# Patient Record
Sex: MALE | Race: WHITE | Employment: FULL TIME | URBAN - METROPOLITAN AREA
[De-identification: names, ages, dates, MRNs, and addresses within clinical notes are randomized per-mention and may not be internally consistent; named-entity substitution may affect disease eponyms.]

---

## 2023-09-16 LAB
CHOLESTEROL (MG/DL) IN SER/PLAS: 122 MG/DL (ref 0–199)
CHOLESTEROL IN HDL (MG/DL) IN SER/PLAS: 37.5 MG/DL
CHOLESTEROL/HDL RATIO: 3.3
LDL: 70 MG/DL (ref 0–99)
TRIGLYCERIDE (MG/DL) IN SER/PLAS: 73 MG/DL (ref 0–149)
VLDL: 15 MG/DL (ref 0–40)

## 2023-09-20 PROBLEM — E10.9 TYPE 1 DIABETES MELLITUS (MULTI): Status: ACTIVE | Noted: 2023-09-20

## 2023-09-20 PROBLEM — I10 HTN (HYPERTENSION): Status: ACTIVE | Noted: 2023-09-20

## 2023-09-20 PROBLEM — E10.649 HYPOGLYCEMIA DUE TO TYPE 1 DIABETES MELLITUS (MULTI): Status: ACTIVE | Noted: 2023-09-20

## 2023-09-20 PROBLEM — Z96.41 INSULIN PUMP IN PLACE: Status: ACTIVE | Noted: 2023-09-20

## 2023-09-20 PROBLEM — E66.9 OBESITY (BMI 30-39.9): Status: ACTIVE | Noted: 2023-09-20

## 2023-09-20 PROBLEM — F51.01 INSOMNIA, IDIOPATHIC: Status: ACTIVE | Noted: 2023-09-20

## 2023-09-20 PROBLEM — E78.5 HYPERLIPIDEMIA: Status: ACTIVE | Noted: 2023-09-20

## 2023-09-20 RX ORDER — ESZOPICLONE 3 MG/1
1 TABLET, FILM COATED ORAL NIGHTLY PRN
COMMUNITY
Start: 2021-05-14

## 2023-09-20 RX ORDER — SEMAGLUTIDE 1.34 MG/ML
INJECTION, SOLUTION SUBCUTANEOUS
COMMUNITY
Start: 2022-05-03 | End: 2023-10-20 | Stop reason: ALTCHOICE

## 2023-09-20 RX ORDER — ZOLPIDEM TARTRATE 10 MG/1
TABLET ORAL
COMMUNITY
Start: 2023-09-06 | End: 2023-10-06

## 2023-09-20 RX ORDER — GLUCAGON INJECTION, SOLUTION 1 MG/.2ML
INJECTION, SOLUTION SUBCUTANEOUS
COMMUNITY
Start: 2022-04-13 | End: 2023-10-20 | Stop reason: SDUPTHER

## 2023-09-20 RX ORDER — INSULIN GLARGINE 100 [IU]/ML
INJECTION, SOLUTION SUBCUTANEOUS
COMMUNITY
End: 2023-10-20 | Stop reason: ALTCHOICE

## 2023-09-20 RX ORDER — LISINOPRIL 5 MG/1
1 TABLET ORAL DAILY
COMMUNITY
Start: 2019-11-12

## 2023-09-20 RX ORDER — ROSUVASTATIN CALCIUM 5 MG/1
5 TABLET, COATED ORAL DAILY
COMMUNITY
End: 2024-02-22

## 2023-09-20 RX ORDER — INSULIN ASPART INJECTION 100 [IU]/ML
INJECTION, SOLUTION SUBCUTANEOUS
COMMUNITY
Start: 2021-11-26 | End: 2023-10-20 | Stop reason: ALTCHOICE

## 2023-09-20 RX ORDER — HYDROXYZINE HYDROCHLORIDE 50 MG/1
TABLET, FILM COATED ORAL
COMMUNITY
Start: 2022-07-21

## 2023-10-11 ENCOUNTER — PATIENT MESSAGE (OUTPATIENT)
Dept: ENDOCRINOLOGY | Facility: CLINIC | Age: 33
End: 2023-10-11
Payer: MEDICARE

## 2023-10-20 ENCOUNTER — TELEMEDICINE (OUTPATIENT)
Dept: ENDOCRINOLOGY | Facility: CLINIC | Age: 33
End: 2023-10-20
Payer: MEDICARE

## 2023-10-20 DIAGNOSIS — Z96.41 INSULIN PUMP IN PLACE: ICD-10-CM

## 2023-10-20 DIAGNOSIS — E10.65 TYPE 1 DIABETES MELLITUS WITH HYPERGLYCEMIA (MULTI): ICD-10-CM

## 2023-10-20 DIAGNOSIS — E10.649 HYPOGLYCEMIA DUE TO TYPE 1 DIABETES MELLITUS (MULTI): Primary | ICD-10-CM

## 2023-10-20 PROCEDURE — 99214 OFFICE O/P EST MOD 30 MIN: CPT | Performed by: NURSE PRACTITIONER

## 2023-10-20 RX ORDER — INSULIN GLARGINE 300 U/ML
INJECTION, SOLUTION SUBCUTANEOUS
Qty: 9 ML | Refills: 3 | Status: SHIPPED | OUTPATIENT
Start: 2023-10-20

## 2023-10-20 RX ORDER — GLUCAGON INJECTION, SOLUTION 1 MG/.2ML
INJECTION, SOLUTION SUBCUTANEOUS
Qty: 0.4 ML | Refills: 3 | Status: SHIPPED | OUTPATIENT
Start: 2023-10-20

## 2023-10-20 NOTE — PROGRESS NOTES
Subjective   Sven Son is a 33 y.o. male presents today for a follow up of DM Type 1. Initial diagnosis with diabetes was at age 4, in 1995.  The patient was adopted so unknown family history.    Known complications include: obesity    Last visit with me 6/2023  A1c 7.1% in 9/2023 from .  Previous A1c 6.5% in 6/2023.   Since last visit, he has seen PharmD between visits   We have also been in communication between visits.    Had a couple days of lows but now that has resolved.    No changes made at that time   He is enjoying his new job at [employer]       Current diabetes regimen is as follows:   Ozempic 2 mg once weekly   Humalog U200 via Tslim Control IQ insulin pump     Patient is using continuous glucose monitor Dexcom G6  The patient is currently checking the blood glucose as needed    Hypoglycemia frequency: 0%  Hypoglycemia awareness: Yes     Regarding symptoms of hyperglycemia, the patient is not experiencing any symptoms such as polyuria, polydipsia, nocturia or rapid weight loss or blurry vision. The patient comes into the office today hyperglycemia.      ROS  General: no fever, chills or acute changes in weight in the last 6 months  Skin: no rashes, pruritis or dry skin  Cardiac: denies chest pain, heart palpitations or orthopnea  Pulmonary: denies wheezing, productive cough or exertional dyspnea      Objective    Physical Exam  Unable to obtain blood pressure today due to virtual visit  General Appearance: Well appearing, alert, in no acute distress, well-hydrated, well nourished.  Affect: alert, cooperative       Current Outpatient Medications:     eszopiclone (Lunesta) 3 mg tablet, Take 1 tablet (3 mg) by mouth as needed at bedtime., Disp: , Rfl:     glucagon (Gvoke HypoPen 2-Pack) 1 mg/0.2 mL auto-injector, Use one pen device to treat severe hypoglycemic event., Disp: , Rfl:     hydrOXYzine HCL (Atarax) 50 mg tablet, take 1 (ONE) tablet by mouth daily as needed for anxiety, Disp: , Rfl:      insulin aspart, with niacinamide, (Fiasp U-100 Insulin) 100 unit/mL injection, Fill pump cartridge to 220 units every 2 days. E10.9, Disp: , Rfl:     insulin glargine (Lantus U-100 Insulin) 100 unit/mL injection, Inject with 70 units every day at bedtime, Disp: , Rfl:     insulin lispro (HumaLOG) 200 unit/mL (3 mL) insulin pen pen, Inject up to 220 units per day, Disp: , Rfl:     lisinopril 5 mg tablet, Take 1 tablet (5 mg) by mouth once daily., Disp: , Rfl:     rosuvastatin (Crestor) 5 mg tablet, Take 1 tablet (5 mg) by mouth once daily., Disp: , Rfl:     semaglutide (Ozempic) 0.25 mg or 0.5 mg(2 mg/1.5 mL) pen injector, Inject subcutaneously 0.25 mg weekly x 4 weeks then increase to 0.5 mg weekly., Disp: , Rfl:     syringe-needle,insulin,0.5 mL (BD INSULIN SYRINGE MISC), Ultrafine II 31G X 5/16' 0.5 ML ; Use as directed - use 3 daily, Disp: , Rfl:     zolpidem (Ambien) 10 mg tablet, Take 1 Tablet by mouth at bedtime as needed for Sleep for up to 30 days., Disp: , Rfl:     Assessment/Plan   Type 1 diabetes with hyperglycemia:   Hypoglycemia due to Type 1 diabetes  Insulin pump status:   -A1c up slightly from previous.  Overall okay.  Time in range is not at goal.  Having hyperglycemia post meals and will adjust carb ratio today.  He can continue to get P[facility].  Unable to do  PAP as he does not have Part D.      Plan:   Pump changes made in office today:   Carb ratio:   1130 = 1.5  1600 = 3.5  1800 = 2.5    Continue all other pump settings as is for now     Continue Dexcom G6    Follow up in 3-4 months

## 2023-10-31 ENCOUNTER — TELEPHONE (OUTPATIENT)
Dept: ENDOCRINOLOGY | Facility: CLINIC | Age: 33
End: 2023-10-31
Payer: MEDICARE

## 2023-11-21 ENCOUNTER — PATIENT MESSAGE (OUTPATIENT)
Dept: ENDOCRINOLOGY | Facility: CLINIC | Age: 33
End: 2023-11-21
Payer: MEDICARE

## 2023-11-21 NOTE — TELEPHONE ENCOUNTER
From: Sven Son  To: Belle Shaver APRN-CNP  Sent: 11/21/2023 6:05 AM EST  Subject: Travel letter     Ford Odonnell,    I am leaving out of town Dec 7 through Dec 14. Is it possible for me to get a letter that I can take with me through airport security?    Please advise

## 2023-11-21 NOTE — LETTER
November 21, 2023       Patient: Sven Son   YOB: 1990   Date of Visit: 11/21/2023     To Whom It May Concern:    The above patient is due to travel by air and has Type 1 diabetes.  He  needs to carry supplies with him at all times to avoid low and high blood sugars.      This may include but is not limited to:    Glucometer  Test strips  Lancets   Alcohol swabs  Insulin pens  Pen needles  Insulin vials  Syringes  Insulin pump  Insulin pump cartridges   Infusion sites  Food  Water       Please contact me if you have any questions.     Sincerely,     Belle Shaver, APRN-CNP

## 2023-11-28 ENCOUNTER — TELEPHONE (OUTPATIENT)
Dept: ENDOCRINOLOGY | Facility: CLINIC | Age: 33
End: 2023-11-28
Payer: MEDICARE

## 2023-11-30 ENCOUNTER — TELEPHONE (OUTPATIENT)
Dept: ENDOCRINOLOGY | Facility: CLINIC | Age: 33
End: 2023-11-30
Payer: MEDICARE

## 2023-11-30 NOTE — TELEPHONE ENCOUNTER
Received letter from [employer] stating that there is information missing from patient's enrollment form.   Spoke to representative- patient missing insurance information  Insurance information faxed with confirmation to [phone].

## 2023-12-31 ENCOUNTER — DOCUMENTATION (OUTPATIENT)
Dept: ENDOCRINOLOGY | Facility: HOSPITAL | Age: 33
End: 2023-12-31
Payer: MEDICARE

## 2024-01-01 NOTE — PROGRESS NOTES
Received a page from the patient as endocrinology on-call.  Patient is complaining of consistently tightly controlled BG over the past 2 days.  Reports that he has been ill for the past 2 days and believes that he had food poisoning.  Blood sugar is dipping after meals to 75-80s (no values lower than that, reports that he has hypoglycemia awareness, did not experience symptomatic hypoglycemia).  It appears that his insulin carb ratio was tightened on his last visit with endocrinology.  He reports that he is only putting in the amount of carbs that he is consuming.  I have no access to the patient CGM data or his exact pump setting.  For the time being asked the patient to put himself in a temporary target/exercise mode to target BG of 150.  His insulin to carb ratio may need to be softened.

## 2024-01-04 ENCOUNTER — TELEPHONE (OUTPATIENT)
Dept: ENDOCRINOLOGY | Facility: CLINIC | Age: 34
End: 2024-01-04
Payer: MEDICARE

## 2024-01-04 NOTE — TELEPHONE ENCOUNTER
"Initiated/received and returned a PWO for Roberto Carlosanna MAYA on 1/4/24  For \" non adjunctive CGM\"  Completed by me and signed by Belle Shaver CNP  Emailed/faxed back to [phone] on 1/4/24      Copy of form on file in email      "

## 2024-01-24 ENCOUNTER — TELEPHONE (OUTPATIENT)
Dept: ENDOCRINOLOGY | Facility: CLINIC | Age: 34
End: 2024-01-24
Payer: MEDICARE

## 2024-01-24 DIAGNOSIS — E10.65 TYPE 1 DIABETES MELLITUS WITH HYPERGLYCEMIA, WITH LONG-TERM CURRENT USE OF INSULIN (MULTI): Primary | ICD-10-CM

## 2024-01-24 NOTE — TELEPHONE ENCOUNTER
Left a VM to see if he needs a referral to see one of the educators. He reports having some difficulties with the G7 he recently transitioned to and would like to meet with someone. He states you can call or send a my chart to advise on best next steps.

## 2024-01-24 NOTE — TELEPHONE ENCOUNTER
Order placed.  I believe he may have seen Jaime and Olimpia in the past.  Please schedule with either one, whoever's schedule if free for his schedule.  Virtual or in person, his choice.  Thanks.

## 2024-02-15 ENCOUNTER — TELEPHONE (OUTPATIENT)
Dept: ENDOCRINOLOGY | Facility: CLINIC | Age: 34
End: 2024-02-15
Payer: MEDICARE

## 2024-02-15 NOTE — TELEPHONE ENCOUNTER
Received a DWO from [employer] on 2/14  Completed by me and signed by Belle  Faxed back to [phone] on 2/15

## 2024-02-20 DIAGNOSIS — E78.5 HYPERLIPIDEMIA, UNSPECIFIED: ICD-10-CM

## 2024-02-22 RX ORDER — ROSUVASTATIN CALCIUM 5 MG/1
5 TABLET, COATED ORAL DAILY
Qty: 90 TABLET | Refills: 3 | Status: SHIPPED | OUTPATIENT
Start: 2024-02-22

## 2024-02-26 ENCOUNTER — OFFICE VISIT (OUTPATIENT)
Dept: ENDOCRINOLOGY | Facility: CLINIC | Age: 34
End: 2024-02-26
Payer: MEDICARE

## 2024-02-26 ENCOUNTER — TELEPHONE (OUTPATIENT)
Dept: ENDOCRINOLOGY | Facility: CLINIC | Age: 34
End: 2024-02-26

## 2024-02-26 VITALS
WEIGHT: 273 LBS | HEIGHT: 70 IN | HEART RATE: 96 BPM | DIASTOLIC BLOOD PRESSURE: 76 MMHG | BODY MASS INDEX: 39.08 KG/M2 | SYSTOLIC BLOOD PRESSURE: 124 MMHG

## 2024-02-26 DIAGNOSIS — E10.65 TYPE 1 DIABETES MELLITUS WITH HYPERGLYCEMIA, WITH LONG-TERM CURRENT USE OF INSULIN (MULTI): ICD-10-CM

## 2024-02-26 LAB — POC HEMOGLOBIN A1C: 7.3 % (ref 4.2–6.5)

## 2024-02-26 PROCEDURE — 3078F DIAST BP <80 MM HG: CPT | Performed by: NURSE PRACTITIONER

## 2024-02-26 PROCEDURE — 99215 OFFICE O/P EST HI 40 MIN: CPT | Performed by: NURSE PRACTITIONER

## 2024-02-26 PROCEDURE — 3074F SYST BP LT 130 MM HG: CPT | Performed by: NURSE PRACTITIONER

## 2024-02-26 PROCEDURE — 4010F ACE/ARB THERAPY RXD/TAKEN: CPT | Performed by: NURSE PRACTITIONER

## 2024-02-26 PROCEDURE — 95251 CONT GLUC MNTR ANALYSIS I&R: CPT | Performed by: NURSE PRACTITIONER

## 2024-02-26 PROCEDURE — 83036 HEMOGLOBIN GLYCOSYLATED A1C: CPT | Performed by: NURSE PRACTITIONER

## 2024-02-26 RX ORDER — DULAGLUTIDE 0.75 MG/.5ML
0.75 INJECTION, SOLUTION SUBCUTANEOUS
Qty: 2 ML | Refills: 11 | Status: SHIPPED | OUTPATIENT
Start: 2024-02-26

## 2024-02-26 NOTE — PATIENT INSTRUCTIONS
Price out Trulicity.    If cost effective, you can pick it up and you would start Trulicity at 0.75 mg once weekly x 1 month    Send me a my chart message if you are tolerating this and we can decide next step      If you cannot get Trulicity due to cost, we can consider ozempic 0.25 mg once weekly x 4-6 weeks   If you are tolerating after 4 weeks then you can increase to 0.5 mg once weekly dose     In your 2 mg pen:   18 clicks = 0.5 mg dose   36 clicks= 1 mg dose   72 clicks= 2 mg dose     For now if we are going to start Trulicity or ozempic only change carb ratio  1130 1:2  4pm 1:2    Notify me sooner if you are still going low around that time.      Cancel appt with adriane Christian with [facility] our dietician      Follow up in 6 months

## 2024-02-26 NOTE — PROGRESS NOTES
"Subjective   Sven Son is a 33 y.o. male presents today for a follow up of DM Type 1. Initial diagnosis with diabetes was at age 4, in 1995.  The patient was adopted so unknown family history.    Known complications include: obesity    Last visit with me 10/2023  A1c 7.3% today.  Previous A1c 7.1% in 9/2023 from .    Since last visit, he has stopped ozempic due to nausea   He noted worsening nausea with ozempic at 2 mg.    Stopped for some time   Restarted at the 2 mg but still had nausea   Wants to lose weight     Current diabetes regimen is as follows:   Ozempic 2 mg once weekly - has been out for a couple months    Humalog U200 via Tslim Control IQ insulin pump     Patient is using continuous glucose monitor Dexcom G7  The patient is currently checking the blood glucose as needed        Hypoglycemia frequency: 0%%  Hypoglycemia awareness: Yes     Regarding symptoms of hyperglycemia, the patient is not experiencing any symptoms such as polyuria, polydipsia, nocturia or rapid weight loss or blurry vision. The patient comes into the office today hyperglycemia.      ROS  General: no fever, chills or acute changes in weight in the last 6 months  Skin: no rashes, pruritis or dry skin  Cardiac: denies chest pain, heart palpitations or orthopnea  Pulmonary: denies wheezing, productive cough or exertional dyspnea        Objective    Physical Exam  Blood pressure 124/76, pulse 96, height 1.778 m (5' 10\"), weight 124 kg (273 lb).  General: not in acute distress, cooperative   Respiratory: normal respiratory effort  Musculoskeletal: normal gait         Current Outpatient Medications:     eszopiclone (Lunesta) 3 mg tablet, Take 1 tablet (3 mg) by mouth as needed at bedtime., Disp: , Rfl:     glucagon (Gvoke HypoPen 2-Pack) 1 mg/0.2 mL auto-injector, Use one pen device to treat severe hypoglycemic event., Disp: 0.4 mL, Rfl: 3    hydrOXYzine HCL (Atarax) 50 mg tablet, take 1 (ONE) tablet by mouth daily as needed for " anxiety, Disp: , Rfl:     insulin glargine (Toujeo Max U-300 SoloStar) 300 unit/mL (3 mL) injection, Inject subcutaneously 110 units daily in case of pump failure, Disp: 9 mL, Rfl: 3    insulin lispro (HumaLOG) 200 unit/mL (3 mL) insulin pen pen, Inject up to 220 units per day, Disp: , Rfl:     lisinopril 5 mg tablet, Take 1 tablet (5 mg) by mouth once daily., Disp: , Rfl:     rosuvastatin (Crestor) 5 mg tablet, TAKE 1 TABLET BY MOUTH EVERY DAY, Disp: 90 tablet, Rfl: 3    syringe-needle,insulin,0.5 mL (BD INSULIN SYRINGE MISC), Ultrafine II 31G X 5/16' 0.5 ML ; Use as directed - use 3 daily, Disp: , Rfl:     zolpidem (Ambien) 10 mg tablet, Take 1 Tablet by mouth at bedtime as needed for Sleep for up to 30 days., Disp: , Rfl:     Assessment/Plan   Type 1 diabetes with hyperglycemia:   Hypoglycemia due to Type 1 diabetes  Insulin pump status:   - A1c up from previous.  TIR is not at goal. His weight is also up about 23 lbs over the last year.  He is interested in weight loss.  Discussed meeting with dietician to focus on how to reduce carbs and add in protein/fat.  He is entering around 215 grams of carbs per day.  Recommended keeping a food log prior to the visit.  He is having post meal spikes and recommended adding back in GLP1 at low dose.  Discussed time at each dose before going up.   He reports sugars crashing at times after lunch.  Can lessen his carb ratio around this time.  Also if he starts ozempic he will need less insulin.   Short term follow up with PharmD for additional adjustments.  Likely Trulicity will be too expensive and he is already getting ozempic from [facility].  He is having some issues with Dexcom G7 but feels he may have had a bad batch.      Plan:   Price out Trulicity.    If cost effective, you can pick it up and you would start Trulicity at 0.75 mg once weekly x 1 month    Send me a my chart message if you are tolerating this and we can decide next step    If you cannot get Trulicity due  to cost, we can consider ozempic 0.25 mg once weekly x 4-6 weeks   If you are tolerating after 4 weeks then you can increase to 0.5 mg once weekly dose   In your 2 mg pen:   18 clicks = 0.5 mg dose   36 clicks= 1 mg dose   72 clicks= 2 mg dose   For now if we are going to start Trulicity or ozempic only change carb ratio  1130 1:2  4pm 1:2  Notify me sooner if you are still going low around that time.    Cancel appt with adriane Christian with [facility] our dietician    Follow up in 6 months

## 2024-02-27 ENCOUNTER — TELEMEDICINE CLINICAL SUPPORT (OUTPATIENT)
Dept: ENDOCRINOLOGY | Facility: CLINIC | Age: 34
End: 2024-02-27
Payer: MEDICARE

## 2024-02-27 ENCOUNTER — APPOINTMENT (OUTPATIENT)
Dept: ENDOCRINOLOGY | Facility: CLINIC | Age: 34
End: 2024-02-27
Payer: MEDICARE

## 2024-02-27 VITALS — HEIGHT: 70 IN | WEIGHT: 273 LBS | BODY MASS INDEX: 39.08 KG/M2

## 2024-02-27 DIAGNOSIS — Z71.3 DIETARY COUNSELING: ICD-10-CM

## 2024-02-27 DIAGNOSIS — E10.65 TYPE 1 DIABETES MELLITUS WITH HYPERGLYCEMIA, WITH LONG-TERM CURRENT USE OF INSULIN (MULTI): ICD-10-CM

## 2024-02-27 PROCEDURE — 97802 MEDICAL NUTRITION INDIV IN: CPT | Performed by: DIETITIAN, REGISTERED

## 2024-02-27 NOTE — PATIENT INSTRUCTIONS
- Please refer to your book entitled: Your Mediterranean Meal Plan, and follow Mediterranean Diet guidelines as discussed with incorporation of healthy foods from each food group and limiting processed foods and high fat meats.  - The Healthy Plate style of eating can be a helpful tool for incorporating healthy balanced meals in appropriate portions. (Healthy Plate: Start with a 9-inch diameter plate. Fill 1/2 the plate with non-starchy vegetables, 1/4 of the plate with whole grains or starchy vegetables, and 1/4 of the plate with a lean source of protein.   - Please consider limiting carb intakes to 45-50 grams at both breakfast and lunch and no more than 60 grams at dinner. If having a snack that has carbs within, limit to no more than 15 grams.   - Consider pre-planning healthy meals for the whole week. Refer to your book for menu and recipe ideas to help get you started.  - Aim for 64 ounces of water daily.  - Keep up the great work with your weekly exercise at [employer].  - Follow-up with nutrition in 6 weeks as scheduled.

## 2024-02-27 NOTE — PROGRESS NOTES
"Initial Nutrition Assessment    Patient was referred to nutrition by MAGALYS Arita for weight management/desire to lose weight. Also considered with education provided is diagnosis of Type 1 DM, HTN and HLD. Pt uses the T-slim insulin pump and Dexcom G6. Pt was seen in office yesterday with CNP. A1c at 7.3%. Setting changes made to insulin pump with noted variability in BG levels after meals.     Diet recall reveals a consistent meal pattern with rare missed meals; however, reported intakes of larger portions and some calorically dense foods all likely contributing to lack of desired weight loss/contributing to weight gain over time, as well as variable BG levels post meals. Fluids meeting low-end recommendations in type and amount with water as primary beverage. No sugar sweetened beverages being consumed at this time. Pt is incorporating consistent physical activity, meeting low-end of weekly recommendations. See all interventions/recommendations below as discussed during visit this day.      Patient reported symptoms: Difficulty losing weight    Anthropometrics:  Height:   Ht Readings from Last 1 Encounters:   02/26/24 1.778 m (5' 10\")      Weight:   Wt Readings from Last 10 Encounters:   02/26/24 124 kg (273 lb)   06/26/23 117 kg (257 lb)   02/28/23 113 kg (249 lb)   11/15/22 110 kg (242 lb)   08/09/22 110 kg (242 lb)   06/02/22 112 kg (247 lb)   05/03/22 112 kg (247 lb)   02/01/22 109 kg (241 lb)   11/12/21 110 kg (241 lb 8 oz)   08/13/21 108 kg (238 lb)      Current BMI:   BMI Readings from Last 1 Encounters:   02/26/24 39.17 kg/m²        Labs:  Lab Results   Component Value Date    HGBA1C 7.3 (A) 02/26/2024      Lab Results   Component Value Date    CHOL 122 09/16/2023    CHOL 198 02/18/2023     Lab Results   Component Value Date    HDL 37.5 (A) 09/16/2023    HDL 43.9 02/18/2023     No results found for: \"LDLCALC\"  Lab Results   Component Value Date    TRIG 73 09/16/2023    TRIG 80 02/18/2023 "       Malnutrition Screening:  Significant Unintentional weight loss: No  Eating less than 75% of usual intake for more than 2 weeks: No  Potential Signs of Inflammation: No    Recommended Malnutrition Diagnosis: No malnutrition identified    Diet Recall-  Breakfast- eggs and chao omelet with toast   Lunch- sandwich (pepperoni and cheese) and soup along with a fruit   Dinner- chicken OR beef OR pork with various vegetables and starches such as potatoes OR rice OR pasta OR has occasional take-out (taco salad for example)  Daily Snacks- sandwich in the evenings OR Belvita crackers OR pretzels during the day   Beverages- SF iced tea, water during the day (20-40 ounces daily), occasional diet soda  Alcohol- seldom  Physical Activity- Orange Nabil twice weekly at this time    Other pertinent patient reported Information:  -Has personal goals of weight loss  -Has had an approximate 23 lbs weight gain over the past year    Nutrition Diagnosis: Overweight/Obesity related to lack of understanding of how to make necessary changes with food intakes as evidenced by BMI above normative standard for age and gender.    Readiness to Learn:  Cognitive ability: Alert and oriented  Motivation to learn: Interested  Family Support: Unable to assess- family not present  Instruction provided to: Patient  Patient learns best by: Multiple methods  Factors affecting learning: None   Physical limitations affecting learning: None    Education Materials Provided:   Your Mediterranean Meal Plan Booklet    Nutrition Interventions/Recommendations for 2/27/2024:  - Please refer to your book entitled: Your Mediterranean Meal Plan, and follow Mediterranean Diet guidelines as discussed with incorporation of healthy foods from each food group and limiting processed foods and high fat meats.  - The Healthy Plate style of eating can be a helpful tool for incorporating healthy balanced meals in appropriate portions. (Healthy Plate: Start with a 9-inch  diameter plate. Fill 1/2 the plate with non-starchy vegetables, 1/4 of the plate with whole grains or starchy vegetables, and 1/4 of the plate with a lean source of protein.   - Please consider limiting carb intakes to 45-50 grams at both breakfast and lunch and no more than 60 grams at dinner. If having a snack that has carbs within, limit to no more than 15 grams.   - Consider pre-planning healthy meals for the whole week. Refer to your book for menu and recipe ideas to help get you started.  - Aim for 64 ounces of water daily.  - Keep up the great work with your weekly exercise at [employer].  - Follow-up with nutrition in 6 weeks as scheduled.      Nutrition Monitoring & Evaluation: Weight loss of 1-2 lbs per week and adherence to recommendations and patient stated goals    Need for follow-up: Individual Nutrition Visit in 4-6 weeks    Referred by: NATHALIE Arita-CNP    MNT Billing Type: Medical Nutrition Assessment, each 15 min increment, for 3 increments.    SIGNATURE:   Jahaira Ribera RD, LD, [facility]                                                             DATE:   2/27/2024

## 2024-04-09 ENCOUNTER — TELEMEDICINE CLINICAL SUPPORT (OUTPATIENT)
Dept: ENDOCRINOLOGY | Facility: CLINIC | Age: 34
End: 2024-04-09
Payer: MEDICARE

## 2024-04-09 ENCOUNTER — APPOINTMENT (OUTPATIENT)
Dept: ENDOCRINOLOGY | Facility: CLINIC | Age: 34
End: 2024-04-09
Payer: MEDICARE

## 2024-04-09 DIAGNOSIS — I10 HYPERTENSION, UNSPECIFIED TYPE: ICD-10-CM

## 2024-04-09 DIAGNOSIS — E10.9 TYPE 1 DIABETES MELLITUS WITHOUT COMPLICATION (MULTI): Primary | ICD-10-CM

## 2024-04-09 DIAGNOSIS — E78.49 OTHER HYPERLIPIDEMIA: ICD-10-CM

## 2024-04-09 DIAGNOSIS — Z71.3 DIETARY COUNSELING: ICD-10-CM

## 2024-04-09 PROCEDURE — 97803 MED NUTRITION INDIV SUBSEQ: CPT | Performed by: DIETITIAN, REGISTERED

## 2024-04-09 NOTE — PROGRESS NOTES
Follow-up Nutrition Assessment    Interval History: Patient presents for follow-up nutrition appointment for weight management/desire to lose weight. Also considered is Type 1 DM, HTN and HLD with education provided. As previously mentioned, pt uses the T-slim insulin pump and the Dexcom G6 sensor with sensor report reviewed during visit. Average BG noted of 183 mg/dL, In range at 51% and above range at 48% with 1% below target range.     Since last nutrition visit, pt with a 6 lbs weight gain and attributes such to larger overall portions. Reports that he was recently started on Topimax for weight loss and hopful it will help with goals. Admits to feeling hungry often and going back for second helpings as a result. States he is trying to watch more closely what he is eating and make healthier food choices which recall does support; however states portions continue to be too large which diet recall also supports. This is likely the reason for weight gain and variable post meal BG levels with a pattern noted mostly following breakfast meal BG elevations. States he is often guesstimating portions and entering carbs into the pump with guesstimates. See all interventions/recommendations below as discussed during visit this day.      Nutrition Interventions/Recommendations from last visit scheduled on 2/27/2024:  - Please refer to your book entitled: Your Mediterranean Meal Plan, and follow Mediterranean Diet guidelines as discussed with incorporation of healthy foods from each food group and limiting processed foods and high fat meats.  - The Healthy Plate style of eating can be a helpful tool for incorporating healthy balanced meals in appropriate portions. (Healthy Plate: Start with a 9-inch diameter plate. Fill 1/2 the plate with non-starchy vegetables, 1/4 of the plate with whole grains or starchy vegetables, and 1/4 of the plate with a lean source of protein.   - Please consider limiting carb intakes to 45-50 grams  "at both breakfast and lunch and no more than 60 grams at dinner. If having a snack that has carbs within, limit to no more than 15 grams.   - Consider pre-planning healthy meals for the whole week. Refer to your book for menu and recipe ideas to help get you started.  - Aim for 64 ounces of water daily.  - Keep up the great work with your weekly exercise at [employer].  - Follow-up with nutrition in 6 weeks as scheduled.      Anthropometrics:  Height:   Ht Readings from Last 1 Encounters:   02/27/24 1.778 m (5' 10\")      Weight:   Wt Readings from Last 10 Encounters:   02/27/24 124 kg (273 lb)   02/26/24 124 kg (273 lb)   06/26/23 117 kg (257 lb)   02/28/23 113 kg (249 lb)   11/15/22 110 kg (242 lb)   08/09/22 110 kg (242 lb)   06/02/22 112 kg (247 lb)   05/03/22 112 kg (247 lb)   02/01/22 109 kg (241 lb)   11/12/21 110 kg (241 lb 8 oz)      Current BMI:   BMI Readings from Last 1 Encounters:   02/27/24 39.17 kg/m²        Malnutrition Screening:  Significant Unintentional weight loss: No  Eating less than 75% of usual intake for more than 2 weeks: No  Potential Signs of Inflammation: No    Recommended Malnutrition Diagnosis: No malnutrition identified    Diet Recall-  Breakfast- breakfast wrap from the Mediterranean Book   Lunch- sandwich (turkey OR ham instead of pepperoni) and soup and fruit  Dinner-  chicken OR beef OR pork with various vegetables and starches such as potatoes OR rice OR pasta OR has occasional take-out (taco salad for example)   Snacks- Triscuit crackers and cheese OR nut OR trail mix and occasionally pretzels   Beverages- SF iced tea, water during the day (20-40 ounces daily), occasional diet soda   Alcohol- seldom   Physical Activity- [employer] twice weekly at this time     Other pertinent patient reported Information:  - Trying to watch more closely what he is eating and limit portions and increase veggies  - Feels portions sizes still need work   - Started on Topimax recently for " weight loss     Nutrition Diagnosis: Overweight/Obesity related to lack of understanding of how to make necessary changes with food intakes as evidenced by BMI above normative standard for age and gender.     Readiness to Learn:  Cognitive ability: Alert and oriented  Motivation to learn: Interested  Family Support: Unable to assess- family not present  Instruction provided to: Patient  Patient learns best by: Multiple methods  Factors affecting learning: None   Physical limitations affecting learning: None    Education Materials Provided:   None this visit    Nutrition Interventions/Recommendations for 4/9/2024:  - Please use the portion place to assist with portion control as reviewed during visit.  - Make meals last for at least 20 minutes (put fork down between bites, chew foods 20 times per bite, drink water between bites, use your non-dominant hand for eating or even can try using chop sticks).  - Before going back for seconds, walk away from the table for 10-15 minutes and do something to occupy your mind such as going for a walk, answering e-mails, etc. This will distract you and help to add more time to allow your stomach and brain to communicate where you very well might not be as hungry as you thought you were to have a second helping.  - Aim to drink 64 ounces of water daily. Sometimes we can misinterpret a feeling of thirst for a feeling of hunger as discussed.  - Consider tracking daily food intake for accountability with food choices and portions and aim for 1725-1099 calories daily. This can also help you to properly identify exact amounts of carbs consumed so that you are entering carb grams into the pump for most accurate insulin coverage.  - Follow-up with nutrition on May 30th as scheduled.        Nutrition Monitoring & Evaluation: adherence to recommendations and patient stated goals    Need for follow-up: Individual Nutrition Visit in 4-6 weeks    Referred by: NATHALIE Arita-LAURA MORENOT  Billing Type: Medical Nutrition Re-Assessment, each 15 min increment, for 3 increments.    SIGNATURE:   Jahaira Ribera RD, LD, [facility]                                                             DATE:   4/9/2024

## 2024-05-02 NOTE — TELEPHONE ENCOUNTER
Patient sent my chart on 5/1 stating [facility] told him there was not an active Rx on file. I pulled the form previously submitted in February and sent to our contact there. She agreed to look into it further. On 5/2 she messaged stating that all was cleared up and that his Rx for 9 boxes (90 days) of supplies was shipped and would arrive in 2 business days.  Patient was notified via my chart. All communications regard are documented in my chart.

## 2024-05-30 ENCOUNTER — TELEMEDICINE CLINICAL SUPPORT (OUTPATIENT)
Dept: ENDOCRINOLOGY | Facility: CLINIC | Age: 34
End: 2024-05-30
Payer: MEDICARE

## 2024-05-30 VITALS — BODY MASS INDEX: 39.94 KG/M2 | WEIGHT: 279 LBS | HEIGHT: 70 IN

## 2024-05-30 DIAGNOSIS — Z71.3 DIETARY COUNSELING: ICD-10-CM

## 2024-05-30 DIAGNOSIS — E10.9 TYPE 1 DIABETES MELLITUS WITHOUT COMPLICATION (MULTI): Primary | ICD-10-CM

## 2024-05-30 PROCEDURE — 97803 MED NUTRITION INDIV SUBSEQ: CPT | Performed by: DIETITIAN, REGISTERED

## 2024-05-30 NOTE — PATIENT INSTRUCTIONS
- Please continue to use the portion plate to assist with portion control.  - Continue walk away from the table for 10-15 minutes and do something to occupy your mind such as going for a walk, answering e-mails, etc. to help with reducing likelihood of having a second serving at meals.   - Continue to aim to drink 64 ounces of water daily. As last discussed, sometimes we can misinterpret a feeling of thirst for a feeling of hunger.  - Continue to use the iris for proper carb content identification with portions of foods you consume so that exact carb grams can be entered into the pump for most accurate insulin coverage.  - Keep up the great work with your weekly exercise.  - Follow-up with nutrition on July 11th as scheduled.

## 2024-05-30 NOTE — PROGRESS NOTES
Follow-up Nutrition Assessment    Interval History: Patient presents for follow-up nutrition appointment for weight management/desire to lose weight. Also considered is Type 1 DM, HTN and HLD with education provided. As previously mentioned, pt uses the T-slim insulin pump and the Dexcom G6 sensor with sensor report reviewed. Average BG noted of 181 mg/dL, In range at 52% and above range at 47% with 1% below target range.     Since last nutrition visit, pt with weight maintenance. Pt reports has not been able to track food/CHO intakes as he had intended but trying to pay closer attention to overall portions and use a program on his phone that properly identifies CHO content of foods for proper CHO entry into the pump. Admits that controlling portions is still often challenging at times. Trying to work on walking away from meals/table to reduce second helpings/reduce overall portions which is getting better. Has been successful with eliminating snacking during the day. Still having some snacks in the evenings but less frequently by report. Prescribed medication Topamax and feels it is working to help with appetite but working slowly. States he might have the addition of another medication to help with appetite control and plans to discuss this further with PCP at his next visit. With regards to exercise, states that now with the nicer weather, has been doing more activity outside such as walking and bike riding in addition to going to [employer] twice weekly. See all interventions/recommendations below as discussed during visit this day.       Nutrition Interventions/Recommendations from last visit scheduled on 4/9/24:  - Please use the portion plate to assist with portion control as reviewed during visit.  - Make meals last for at least 20 minutes (put fork down between bites, chew foods 20 times per bite, drink water between bites, use your non-dominant hand for eating or even can try using chop sticks).  -  "Before going back for seconds, walk away from the table for 10-15 minutes and do something to occupy your mind such as going for a walk, answering e-mails, etc. This will distract you and help to add more time to allow your stomach and brain to communicate where you very well might not be as hungry as you thought you were to have a second helping.  - Aim to drink 64 ounces of water daily. Sometimes we can misinterpret a feeling of thirst for a feeling of hunger as discussed.  - Consider tracking daily food intake for accountability with food choices and portions and aim for 9395-4209 calories daily. This can also help you to properly identify exact amounts of carbs consumed so that you are entering carb grams into the pump for most accurate insulin coverage.  - Follow-up with nutrition on May 30th as scheduled.        Anthropometrics:  Height:   Ht Readings from Last 1 Encounters:   02/27/24 1.778 m (5' 10\")      Weight:   Wt Readings from Last 10 Encounters:   02/27/24 124 kg (273 lb)   02/26/24 124 kg (273 lb)   06/26/23 117 kg (257 lb)   02/28/23 113 kg (249 lb)   11/15/22 110 kg (242 lb)   08/09/22 110 kg (242 lb)   06/02/22 112 kg (247 lb)   05/03/22 112 kg (247 lb)   02/01/22 109 kg (241 lb)   11/12/21 110 kg (241 lb 8 oz)      Current BMI:   BMI Readings from Last 1 Encounters:   02/27/24 39.17 kg/m²        Malnutrition Screening:  Significant Unintentional weight loss: No  Eating less than 75% of usual intake for more than 2 weeks: No  Potential Signs of Inflammation: No    Recommended Malnutrition Diagnosis: No malnutrition identified    Diet Recall-  Breakfast- breakfast wrap from the Mediterranean Book OR has eggs and chao and English muffin   Lunch- sandwich (turkey OR ham OR bologna) and Greek yogurt and fruit  Dinner-  chicken OR beef OR pork with various vegetables and starches such as potatoes OR rice OR pasta OR has occasional take-out OR when working late will have a lighter dinner such as a salad " with protein OR a sandwich   Snacks- hardly any snacks at all at this time  Beverages- SF iced tea, water during the day (32-48 ounces daily), occasional diet soda   Alcohol- seldom   Physical Activity- [employer] twice weekly and incorporating more walking and bike riding     Other pertinent patient reported Information:  - States has not been able to track food/CHO intakes as he had intended.  - Trying to pay closer attention to portions but admits this is challenging still at times.  - Has been successful with eliminating snacking during the day. Still having some snacks in the evenings but less frequently.   - Trying to work on walking away from meals/table to reduce second helpings/reduce overall portions.  - On Topamax and feels it is working but slowly. Might possibly be adding in another medication by report with PCP to help with appetite control.  - Now with nicer weather, has been doing more activity outside such as walking and bike riding while maintaining [employer] twice weekly.    Nutrition Diagnosis: Overweight/Obesity related to lack of understanding of how to make necessary changes with food intakes as evidenced by BMI above normative standard for age and gender.     Readiness to Learn:  Cognitive ability: Alert and oriented  Motivation to learn: Interested  Family Support: Unable to assess- family not present  Instruction provided to: Patient  Patient learns best by: Multiple methods  Factors affecting learning: None   Physical limitations affecting learning: None    Education Materials Provided:   None this visit    Nutrition Interventions/Recommendations for 5/30/2024:  - Please continue to use the portion plate to assist with portion control.  - Continue walk away from the table for 10-15 minutes and do something to occupy your mind such as going for a walk, answering e-mails, etc. to help with reducing likelihood of having a second serving at meals.   - Continue to aim to drink 64 ounces  of water daily. As last discussed, sometimes we can misinterpret a feeling of thirst for a feeling of hunger.  - Continue to use the iris for proper carb content identification with portions of foods you consume so that exact carb grams can be entered into the pump for most accurate insulin coverage.  - Keep up the great work with your weekly exercise.  - Follow-up with nutrition on July 11th as scheduled.        Nutrition Monitoring & Evaluation: adherence to recommendations and patient stated goals    Need for follow-up:  July as scheduled    Referred by: NATHALIE Arita-CNP    MNT Billing Type: Medical Nutrition Re-Assessment, each 15 min increment, for 2 increments.    SIGNATURE:   Jahaira Ribera RD, LD, [facility]                                                             DATE:   5/30/2024

## 2024-06-04 ENCOUNTER — TELEPHONE (OUTPATIENT)
Dept: ENDOCRINOLOGY | Facility: CLINIC | Age: 34
End: 2024-06-04
Payer: MEDICARE

## 2024-06-04 DIAGNOSIS — E10.9 TYPE 1 DIABETES MELLITUS WITHOUT COMPLICATION (MULTI): Primary | ICD-10-CM

## 2024-06-04 DIAGNOSIS — E10.65 TYPE 1 DIABETES MELLITUS WITH HYPERGLYCEMIA, WITH LONG-TERM CURRENT USE OF INSULIN (MULTI): ICD-10-CM

## 2024-06-04 NOTE — TELEPHONE ENCOUNTER
Left a VM yesterday afternoon. He states he is running short on the insulin that he gets through [facility]. [facility] cannot refill till the end of the week. He's not sure why he's running out, but asks that a short term supply be sent to his [facility] in [place]. Per last note he is using up to 220 units daily of the U200.

## 2024-06-27 ENCOUNTER — TELEPHONE (OUTPATIENT)
Dept: ENDOCRINOLOGY | Facility: CLINIC | Age: 34
End: 2024-06-27
Payer: MEDICARE

## 2024-06-27 NOTE — TELEPHONE ENCOUNTER
Received faxed request from [employer] on 6/27 for most recent office notes.     Notes from Feb 2024 faxed to [phone] on 6/27  (Pt's next appt is 9/3)

## 2024-07-11 ENCOUNTER — APPOINTMENT (OUTPATIENT)
Dept: ENDOCRINOLOGY | Facility: CLINIC | Age: 34
End: 2024-07-11
Payer: MEDICARE

## 2024-07-11 VITALS — BODY MASS INDEX: 40.09 KG/M2 | WEIGHT: 280 LBS | HEIGHT: 70 IN

## 2024-07-11 DIAGNOSIS — E10.9 TYPE 1 DIABETES MELLITUS WITHOUT COMPLICATION (MULTI): Primary | ICD-10-CM

## 2024-07-11 DIAGNOSIS — Z71.3 DIETARY COUNSELING: ICD-10-CM

## 2024-07-11 PROCEDURE — 97803 MED NUTRITION INDIV SUBSEQ: CPT | Performed by: DIETITIAN, REGISTERED

## 2024-07-11 NOTE — PATIENT INSTRUCTIONS
- Please consider tracking all food intake consistently using the What's in My Handbag Net Diary iris as discussed, aiming for 8813-7782 calories a day.   - Continue to aim to drink 64 ounces of water daily and aim for incorporation of healthy foods following the Mediterranean diet principles (refer to book sent at time of initial visit).  - Aim to enter the exact carb grams you are eating into your pump for adequate insulin coverage.  - Keep up the great work with your weekly exercise.  - Follow-up with nutrition in August as scheduled.

## 2024-07-11 NOTE — PROGRESS NOTES
Follow-up Nutrition Assessment    Interval History: Patient presents for follow-up nutrition appointment for weight management/desire to lose weight. Also considered are diagnoses of Type 1 DM, HTN and HLD. As previously mentioned, pt uses the T-slim insulin pump and the Dexcom G6 sensor with sensor report reviewed. Average BG noted of 189 mg/dL, In range at 47% and above range at 53% with a noted pattern of night time highs between 11 PM- 2 AM. Some post prandial BG elevations noted however not consistent enough to depict a specific pattern. Pt states he continues to enter CHO grams into pump based upon what he is eating.      Since last nutrition visit, pt with weight maintenance. Maintains that he is still not tracking food/CHO intakes but states he is ready to do so to assist in achieving goals with desired weight loss and BG control. States he continues to try to incorporate a variety of healthy foods and trying to be mindful of overall portions. Further reports that he is staying consistent with limiting snacking overall. Staying active with walking and riding in addition to going to [email or web address] twice weekly. See all interventions/recommendations below as discussed during visit this day.       Nutrition Interventions/Recommendations from last visit scheduled on 5/30/24:  - Please continue to use the portion plate to assist with portion control.  - Continue walk away from the table for 10-15 minutes and do something to occupy your mind such as going for a walk, answering e-mails, etc. to help with reducing likelihood of having a second serving at meals.   - Continue to aim to drink 64 ounces of water daily. As last discussed, sometimes we can misinterpret a feeling of thirst for a feeling of hunger.  - Continue to use the iris for proper carb content identification with portions of foods you consume so that exact carb grams can be entered into the pump for most accurate insulin coverage.  - Keep up the great  "work with your weekly exercise.  - Follow-up with nutrition on July 11th as scheduled.      Anthropometrics:  Height:   Ht Readings from Last 1 Encounters:   07/11/24 1.778 m (5' 10\")      Weight:   Wt Readings from Last 10 Encounters:   05/30/24 127 kg (279 lb)   02/27/24 124 kg (273 lb)   02/26/24 124 kg (273 lb)   06/26/23 117 kg (257 lb)   02/28/23 113 kg (249 lb)   11/15/22 110 kg (242 lb)   08/09/22 110 kg (242 lb)   06/02/22 112 kg (247 lb)   05/03/22 112 kg (247 lb)   02/01/22 109 kg (241 lb)      Current BMI:   BMI Readings from Last 1 Encounters:   07/11/24 40.03 kg/m²        Malnutrition Screening:  Significant Unintentional weight loss: No  Eating less than 75% of usual intake for more than 2 weeks: No  Potential Signs of Inflammation: No    Recommended Malnutrition Diagnosis: No malnutrition identified    Diet Recall-  Breakfast- eggs and English muffin  Lunch- sandwich (ham) on whole wheat bread along with a pre-made salad with a yogurt and fruit  Dinner- various proteins, starches and vegetables   Snacks- not much at all or maybe sometimes a snack size candy bar   Beverages- water and diet soda and iced tea during the day   Alcohol- socially only   Physical Activity-  [employer] twice weekly and incorporating more walking and bike riding     Other pertinent patient reported Information:  - Feels things are going ok but frustrated with lack of weight loss.  - Has not been tracking food intakes but willing to try to properly assess portions consumed and then calorie intakes daily.     Nutrition Diagnosis: Overweight/Obesity related to lack of understanding of how to make necessary changes with food intakes as evidenced by BMI above normative standard for age and gender.     Readiness to Learn:  Cognitive ability: Alert and oriented  Motivation to learn: Interested  Family Support: Unable to assess- family not present  Instruction provided to: Patient  Patient learns best by: Multiple methods  Factors " affecting learning: None   Physical limitations affecting learning: None    Education Materials Provided:   None this visit    Nutrition Interventions/Recommendations for 7/11/2024:  - Please consider tracking all food intake consistently using the My Net Diary iris as discussed, aiming for 3112-2205 calories a day.   - Continue to aim to drink 64 ounces of water daily and aim for incorporation of healthy foods following the Mediterranean diet principles (refer to book sent at time of initial visit).  - Aim to enter the exact carb grams you are eating into your pump for adequate insulin coverage.  - Keep up the great work with your weekly exercise.  - Follow-up with nutrition in August as scheduled.      Nutrition Monitoring & Evaluation: adherence to recommendations and patient stated goals    Need for follow-up: Individual Nutrition Visit in 4-6 weeks    Referred by: NATHALIE Arita-CNP    MNT Billing Type: Medical Nutrition Re-Assessment, each 15 min increment, for 2 increments.    SIGNATURE:   Jahaira Ribera RD, SURI, LD, [facility]                                                          DATE:   7/11/2024

## 2024-07-16 ENCOUNTER — OFFICE VISIT (OUTPATIENT)
Dept: URGENT CARE | Facility: CLINIC | Age: 34
End: 2024-07-16
Payer: MEDICARE

## 2024-07-16 VITALS
RESPIRATION RATE: 20 BRPM | WEIGHT: 280 LBS | BODY MASS INDEX: 40.18 KG/M2 | OXYGEN SATURATION: 94 % | DIASTOLIC BLOOD PRESSURE: 75 MMHG | HEART RATE: 113 BPM | SYSTOLIC BLOOD PRESSURE: 127 MMHG | TEMPERATURE: 98.1 F

## 2024-07-16 DIAGNOSIS — J20.9 ACUTE BRONCHITIS, UNSPECIFIED ORGANISM: Primary | ICD-10-CM

## 2024-07-16 PROCEDURE — 4010F ACE/ARB THERAPY RXD/TAKEN: CPT | Performed by: PHYSICIAN ASSISTANT

## 2024-07-16 PROCEDURE — 3074F SYST BP LT 130 MM HG: CPT | Performed by: PHYSICIAN ASSISTANT

## 2024-07-16 PROCEDURE — 3078F DIAST BP <80 MM HG: CPT | Performed by: PHYSICIAN ASSISTANT

## 2024-07-16 PROCEDURE — 99203 OFFICE O/P NEW LOW 30 MIN: CPT | Performed by: PHYSICIAN ASSISTANT

## 2024-07-16 PROCEDURE — 1036F TOBACCO NON-USER: CPT | Performed by: PHYSICIAN ASSISTANT

## 2024-07-16 RX ORDER — BENZONATATE 100 MG/1
100 CAPSULE ORAL 3 TIMES DAILY PRN
Qty: 30 CAPSULE | Refills: 0 | Status: SHIPPED | OUTPATIENT
Start: 2024-07-16 | End: 2024-07-26

## 2024-07-16 RX ORDER — AZITHROMYCIN 250 MG/1
TABLET, FILM COATED ORAL
Qty: 6 TABLET | Refills: 0 | Status: SHIPPED | OUTPATIENT
Start: 2024-07-16

## 2024-07-16 ASSESSMENT — ENCOUNTER SYMPTOMS: COUGH: 1

## 2024-07-16 NOTE — PROGRESS NOTES
Subjective   Patient ID: Sven Son is a 33 y.o. male.    Patient is a 33-year-old male who complains of ongoing loose, productive cough that he has been experiencing for approximately the past 2 weeks.  Patient denies congestion, sinus pressure, ear pain or sore throat.  Patient reports no fever, chills or myalgia.  Patient has no history of asthma or COPD and does not smoke.      Cough    The following portions of the chart were reviewed this encounter and updated as appropriate:       Review of Systems   Respiratory:  Positive for cough.    All other systems reviewed and are negative.  Objective   Physical Exam  Vitals and nursing note reviewed.   Constitutional:       Appearance: Normal appearance. He is normal weight.   HENT:      Head: Normocephalic and atraumatic.      Right Ear: External ear normal.      Left Ear: External ear normal.      Nose: Nose normal. No congestion or rhinorrhea.      Mouth/Throat:      Mouth: Mucous membranes are moist.      Pharynx: Oropharynx is clear. No oropharyngeal exudate or posterior oropharyngeal erythema.   Eyes:      Extraocular Movements: Extraocular movements intact.      Conjunctiva/sclera: Conjunctivae normal.      Pupils: Pupils are equal, round, and reactive to light.   Cardiovascular:      Rate and Rhythm: Normal rate and regular rhythm.      Pulses: Normal pulses.      Heart sounds: Normal heart sounds.   Pulmonary:      Effort: Pulmonary effort is normal. No respiratory distress.      Breath sounds: Normal breath sounds. No stridor. No wheezing, rhonchi or rales.   Musculoskeletal:      Cervical back: Normal range of motion and neck supple.   Skin:     General: Skin is warm and dry.      Capillary Refill: Capillary refill takes less than 2 seconds.   Neurological:      General: No focal deficit present.      Mental Status: He is alert and oriented to person, place, and time.   Psychiatric:         Mood and Affect: Mood normal.         Behavior: Behavior  normal.         Thought Content: Thought content normal.         Judgment: Judgment normal.     Assessment/Plan   Physical exam findings as noted above.  Patient was provided with prescriptions for Zithromax 250 mg and Tessalon 100 mg.  Supportive care instructions were discussed and the patient verbalizes good understanding of same.    CLINICAL IMPRESSION:  Acute Bronchitis    Diagnoses and all orders for this visit:  Acute bronchitis, unspecified organism  -     azithromycin (Zithromax) 250 mg tablet; Take 2 tablets (500 mg) on  Day 1,  followed by 1 tablet (250 mg) once daily on Days 2 through 5.  -     benzonatate (Tessalon Perles) 100 mg capsule; Take 1 capsule (100 mg) by mouth 3 times a day as needed for cough for up to 10 days.    Patient disposition: Home

## 2024-08-15 ENCOUNTER — APPOINTMENT (OUTPATIENT)
Dept: ENDOCRINOLOGY | Facility: CLINIC | Age: 34
End: 2024-08-15
Payer: MEDICARE

## 2024-09-03 ENCOUNTER — APPOINTMENT (OUTPATIENT)
Dept: ENDOCRINOLOGY | Facility: CLINIC | Age: 34
End: 2024-09-03
Payer: MEDICARE

## 2024-09-03 VITALS
HEART RATE: 109 BPM | BODY MASS INDEX: 40.99 KG/M2 | HEIGHT: 70 IN | SYSTOLIC BLOOD PRESSURE: 129 MMHG | DIASTOLIC BLOOD PRESSURE: 85 MMHG | TEMPERATURE: 97.5 F | WEIGHT: 286.3 LBS

## 2024-09-03 DIAGNOSIS — E78.5 HYPERLIPIDEMIA, UNSPECIFIED: ICD-10-CM

## 2024-09-03 DIAGNOSIS — E10.9 TYPE 1 DIABETES MELLITUS WITHOUT COMPLICATION (MULTI): ICD-10-CM

## 2024-09-03 LAB — POC HEMOGLOBIN A1C: 7.5 % (ref 4.2–6.5)

## 2024-09-03 PROCEDURE — 3008F BODY MASS INDEX DOCD: CPT | Performed by: NURSE PRACTITIONER

## 2024-09-03 PROCEDURE — 99215 OFFICE O/P EST HI 40 MIN: CPT | Performed by: NURSE PRACTITIONER

## 2024-09-03 PROCEDURE — 3074F SYST BP LT 130 MM HG: CPT | Performed by: NURSE PRACTITIONER

## 2024-09-03 PROCEDURE — 4010F ACE/ARB THERAPY RXD/TAKEN: CPT | Performed by: NURSE PRACTITIONER

## 2024-09-03 PROCEDURE — 95251 CONT GLUC MNTR ANALYSIS I&R: CPT | Performed by: NURSE PRACTITIONER

## 2024-09-03 PROCEDURE — 3079F DIAST BP 80-89 MM HG: CPT | Performed by: NURSE PRACTITIONER

## 2024-09-03 PROCEDURE — 1036F TOBACCO NON-USER: CPT | Performed by: NURSE PRACTITIONER

## 2024-09-03 PROCEDURE — 83036 HEMOGLOBIN GLYCOSYLATED A1C: CPT | Performed by: NURSE PRACTITIONER

## 2024-09-03 RX ORDER — AMITRIPTYLINE HYDROCHLORIDE 50 MG/1
1 TABLET, FILM COATED ORAL NIGHTLY
COMMUNITY
Start: 2024-06-17

## 2024-09-03 RX ORDER — ROSUVASTATIN CALCIUM 5 MG/1
5 TABLET, COATED ORAL DAILY
Qty: 90 TABLET | Refills: 3 | Status: SHIPPED | OUTPATIENT
Start: 2024-09-03

## 2024-09-03 RX ORDER — ALBUTEROL SULFATE 90 UG/1
2 INHALANT RESPIRATORY (INHALATION) EVERY 4 HOURS PRN
COMMUNITY
Start: 2024-07-15

## 2024-09-03 NOTE — PROGRESS NOTES
"Subjective   Sven Son is a 33 y.o. male presents today for a follow up of DM Type 1. Initial diagnosis with diabetes was at age 4, in 1995.  The patient was adopted so unknown family history.    Known complications include: obesity    Last visit with me 2/2024  A1c 7.5% today.  Previous A1c 7.2% in 3/2024.    Since last visit, he has seen Jahaira Ribera, dietician   He has not used iris to track carbs  He never started since he got sick   Reports getting bronchitis about 3 months ago   Was sick for 1.5 months  Ended up in the hospital after passing out and hitting his head  Needed antibiotics and albuterol   Feels 80-90% better   Still has lingering cough      Historical meds:   Ozempic- nausea (has not wanted to retry though he tolerated lower doses)     Current diabetes regimen is as follows:   Humalog U200 via Tslim Control IQ insulin pump       Patient is using continuous glucose monitor Dexcom G7  The patient is currently checking the blood glucose as needed        Hypoglycemia frequency: 0%  Hypoglycemia awareness: Yes     Regarding symptoms of hyperglycemia, the patient is not experiencing any symptoms such as polyuria, polydipsia, nocturia or rapid weight loss or blurry vision. The patient comes into the office today hyperglycemia.  He is frustrated that sometime he can eat just a salad and sugars still spike.       ROS  General: no fever, chills or acute changes in weight in the last 6 months  Skin: no rashes, pruritis or dry skin  Cardiac: denies chest pain, heart palpitations or orthopnea  Pulmonary: denies wheezing, productive cough or exertional dyspnea        Objective    Physical Exam  Blood pressure 129/85, pulse 109, temperature 36.4 °C (97.5 °F), temperature source Temporal, height 1.778 m (5' 10\"), weight 130 kg (286 lb 4.8 oz).  General: not in acute distress, cooperative   Respiratory: normal respiratory effort  Musculoskeletal: normal gait         Current Outpatient Medications:     " azithromycin (Zithromax) 250 mg tablet, Take 2 tablets (500 mg) on  Day 1,  followed by 1 tablet (250 mg) once daily on Days 2 through 5., Disp: 6 tablet, Rfl: 0    dulaglutide (Trulicity) 0.75 mg/0.5 mL pen injector, Inject 0.75 mg under the skin 1 (one) time per week. (Patient not taking: Reported on 7/16/2024), Disp: 2 mL, Rfl: 11    eszopiclone (Lunesta) 3 mg tablet, Take 1 tablet (3 mg) by mouth as needed at bedtime., Disp: , Rfl:     glucagon (Gvoke HypoPen 2-Pack) 1 mg/0.2 mL auto-injector, Use one pen device to treat severe hypoglycemic event., Disp: 0.4 mL, Rfl: 3    hydrOXYzine HCL (Atarax) 50 mg tablet, take 1 (ONE) tablet by mouth daily as needed for anxiety, Disp: , Rfl:     insulin glargine (Toujeo Max U-300 SoloStar) 300 unit/mL (3 mL) injection, Inject subcutaneously 110 units daily in case of pump failure (Patient not taking: Reported on 2/26/2024), Disp: 9 mL, Rfl: 3    insulin lispro (HumaLOG) 200 unit/mL (3 mL) insulin pen pen, Inject up to 220 units per day, Disp: 80 mL, Rfl: 3    lisinopril 5 mg tablet, Take 1 tablet (5 mg) by mouth once daily., Disp: , Rfl:     rosuvastatin (Crestor) 5 mg tablet, TAKE 1 TABLET BY MOUTH EVERY DAY, Disp: 90 tablet, Rfl: 3    syringe-needle,insulin,0.5 mL (BD INSULIN SYRINGE MISC), Ultrafine II 31G X 5/16' 0.5 ML ; Use as directed - use 3 daily, Disp: , Rfl:     zolpidem (Ambien) 10 mg tablet, Take 1 Tablet by mouth at bedtime as needed for Sleep for up to 30 days., Disp: , Rfl:     Assessment/Plan   Type 1 diabetes with hyperglycemia:   Hypoglycemia due to Type 1 diabetes  Insulin pump status:   - A1c not at goal, up from previous.  TIR is not at goal.  He continues to have post meal spikes,  He is having to override his boluses.  Will intensify his ISF for now.  May need to adjust further if needed.  He has met with dietician but has not used apps to help calculate carbs.  He is not currently using GLP1 due to GI side effects.  Would love to reconsider at low  dose.  Will schedule with PharmD for additional changes.  Discussed TIR goal is > 70%.      Plan:   Pump changes made in office today:   ISF:   0600 = 18 (previously 25)    1130 = 18 (previously 24)  1600 = 18 (previously 24)  1800 = 18 (previously 24)  2200 = 18 (previously 24)  All other pump settings remain the same for now   Get fasting labs and urine   Schedule with PharmD   Follow up in 5-6 months

## 2024-09-03 NOTE — PATIENT INSTRUCTIONS
Pump changes made in office today:   ISF:   0600 = 18 (previously 25)    1130 = 18 (previously 24)  1600 = 18 (previously 24)  1800 = 18 (previously 24)  2200 = 18 (previously 24)    All other pump settings remain the same for now     Get fasting labs and urine     Schedule with PharmD     Follow up in 6 months      Belle Shaver, NATHALIE-CNP  [address] [facility].  [address] [place] [address]  : [phone]  Fax: [phone]

## 2024-09-05 ENCOUNTER — APPOINTMENT (OUTPATIENT)
Dept: ENDOCRINOLOGY | Facility: CLINIC | Age: 34
End: 2024-09-05
Payer: MEDICARE

## 2024-09-09 NOTE — PROGRESS NOTES
Patient is sent at the request of Belle Shaver, APR* for my opinion regarding diabetes.  My recommendations below will be communicated back to the requesting provider by way of shared medical record.    Recommendations:   ICR intensified during the day  MN 1:10  1:00 1:10  4:00 1:7  6:00 1:3    --> 1:2.5  11:30 1:2    --> 1:1.6  4PM 1:2    --> 1:1.6  6PM 1:2.5 --> 1:2.0  10PM 1:7.5  ________________________________________________________________________    Subjective   Past Medical History:  Patient Active Problem List   Diagnosis    HTN (hypertension)    Hypoglycemia due to type 1 diabetes mellitus (Multi)    Insomnia, idiopathic    Insulin pump in place    Obesity (BMI 30-39.9)    Type 1 diabetes mellitus    Hyperlipidemia    Acute bronchitis     Interim:  Sven Son is a 34 y.o. male presents today for follow up visit with endo PharmD for Type 1 Diabetes Mellitus. Last seen by Belle Shaver on 9/3/24 where the pt had not been tracking his carbs and was frustrated with BGs spiking after meals, at this time is ISF was intensified to 18 all day and labwork was ordered. Today the patient reports he has been trying to carb count more accurately and has also been trying to eat healthier. Per tandem data is entering ~210 gm CHO/day, has been cutting out red meat and eating more vegetables.       Diabetes Pharmacotherapy:    Humalog U200 via Tslim Control IQ insulin pump       Previously trialed meds:   Ozempic - nausea (has not wanted to retry though he tolerated lower doses)     Social:  Current diet: 3 meals/day, tracking carbs more  Breakfast - egg, sausage, cheese sandwich + fruit  Lunch - [place] ,fruit, salad  Dinner - grilled fish + vegetables + fruit  Snack - none  Fluids - water, diet pop, SF tea    Allergies:  Amoxicillin    Medication list:  Current Outpatient Medications   Medication Instructions    albuterol 90 mcg/actuation inhaler 2 puffs, inhalation, Every 4 hours PRN    amitriptyline  (Elavil) 50 mg tablet 1 tablet, oral, Nightly    eszopiclone (Lunesta) 3 mg tablet 1 tablet, oral, Nightly PRN    glucagon (Gvoke HypoPen 2-Pack) 1 mg/0.2 mL auto-injector Use one pen device to treat severe hypoglycemic event.    hydrOXYzine HCL (Atarax) 50 mg tablet take 1 (ONE) tablet by mouth daily as needed for anxiety    insulin glargine (Toujeo Max U-300 SoloStar) 300 unit/mL (3 mL) injection Inject subcutaneously 110 units daily in case of pump failure    insulin lispro (HumaLOG) 200 unit/mL (3 mL) insulin pen pen Inject up to 220 units per day    lisinopril 5 mg tablet 1 tablet, oral, Daily    rosuvastatin (CRESTOR) 5 mg, oral, Daily    syringe-needle,insulin,0.5 mL (BD INSULIN SYRINGE MISC) miscellaneous, Ultrafine II 31G X 5/16' 0.5 ML ; Use as directed - use 3 daily     zolpidem (Ambien) 10 mg tablet Take 1 Tablet by mouth at bedtime as needed for Sleep for up to 30 days.        Objective   Last Recorded Vitals:  BP Readings from Last 3 Encounters:   09/03/24 129/85   07/16/24 127/75   02/26/24 124/76     Wt Readings from Last 3 Encounters:   09/03/24 130 kg (286 lb 4.8 oz)   07/16/24 127 kg (280 lb)   07/11/24 127 kg (280 lb)     BMI Readings from Last 1 Encounters:   09/03/24 41.08 kg/m²      Labs  A1C  Lab Results   Component Value Date    HGBA1C 7.5 (A) 09/03/2024    HGBA1C 7.2 (H) 03/02/2024    HGBA1C 7.3 (A) 02/26/2024     BMP/LFTs  Lab Results   Component Value Date    CREATININE 0.84 02/18/2023    GLUCOSE 109 (H) 02/18/2023     02/18/2023    K 3.8 02/18/2023     02/18/2023    CALCIUM 9.6 02/18/2023    CO2 30 02/18/2023    BUN 23 02/18/2023    ALT 32 02/18/2023    AST 16 02/18/2023    ALKPHOS 76 02/18/2023    BILITOT 0.9 02/18/2023     Lipids  Lab Results   Component Value Date    TRIG 73 09/16/2023    CHOL 122 09/16/2023    LDLF 70 09/16/2023    HDL 37.5 (A) 09/16/2023     Urine Albumin Creatinine Ratio  Lab Results   Component Value Date    MICROALBCREA 9.7 02/18/2023       Home  glucose monitoring:  Hypoglycemia: denies readings <70 mg/dL or s/sx of hypoglycemia        Assessment/Plan   Type 1 Diabetes Mellitus  Goal A1C <6.5%, above goal as of 9/2024. TIR and GMI are not at goal, no hypoglycemia noted. Patient has been counting carbs more recently, on average entering ~210 gm CHO per day, still experiencing post prandial spikes. Discussed with pt, who is agreeable to intensifying ICR today. Currently receiving ~360 units per day, would benefit from GLP1 to improve insulin sensitivity and potentially help with wt loss. Pt previously experienced nausea w/ Ozempic and is hesitant to trial another agent, will think about this and discuss again at next appt.  Plan:  Intensify ICR during the day:  MN 1:10  1:00 1:10  4:00 1:7  6:00 1:3    --> 1:2.5  11:30 1:2    --> 1:1.6  4PM 1:2    --> 1:1.6  6PM 1:2.5 --> 1:2.0  10PM 1:7.5  Continue all other pump settings  Home glucose monitoring:   Will continue Dexcom w/ tandem  A minimum of 72 hours of CGM data was reviewed and used to make therapy changes.   Education Provided to Patient:   Benefits of GLP1 regarding insulin sensitivity and obesity (given pt's interest in wt loss)  Primary prevention:   Therapy: Moderate intensity statin   LDL result meets goal (9/2023)  Renal:  CKD: stage 1 - normal function  ACR: <30 (2/2023)  Renal protective agents: ACEi/ARB  DM medications are dosed appropriately for renal function  Labs: Endo ordered ACR, CMP, and lipids  PharmD follow-up: 1 month  Endo follow-up: 3/19/25    Patient agreeable to plan as above, contact information provided for any future questions or concerns.    Claudio Beasley, PharmD    Type of encounter: virtual  Provider on site: Belle Shaver    Continue all meds under the continuation of care with the referring provider and clinical pharmacy team.

## 2024-10-04 ENCOUNTER — APPOINTMENT (OUTPATIENT)
Dept: ENDOCRINOLOGY | Facility: CLINIC | Age: 34
End: 2024-10-04
Payer: MEDICARE

## 2024-10-04 DIAGNOSIS — E10.9 TYPE 1 DIABETES MELLITUS WITHOUT COMPLICATION: ICD-10-CM

## 2024-10-31 ENCOUNTER — APPOINTMENT (OUTPATIENT)
Dept: ENDOCRINOLOGY | Facility: CLINIC | Age: 34
End: 2024-10-31
Payer: MEDICARE

## 2024-11-04 ENCOUNTER — APPOINTMENT (OUTPATIENT)
Dept: ENDOCRINOLOGY | Facility: CLINIC | Age: 34
End: 2024-11-04
Payer: MEDICARE

## 2024-11-04 ENCOUNTER — PATIENT MESSAGE (OUTPATIENT)
Dept: ENDOCRINOLOGY | Facility: CLINIC | Age: 34
End: 2024-11-04

## 2024-11-08 ENCOUNTER — TELEPHONE (OUTPATIENT)
Dept: ENDOCRINOLOGY | Facility: CLINIC | Age: 34
End: 2024-11-08
Payer: MEDICARE

## 2024-11-08 NOTE — TELEPHONE ENCOUNTER
Notes request received from [employer] via [employer] Notes from Sept 2024 uploaded into account on 11/08/24.

## 2024-11-14 ENCOUNTER — APPOINTMENT (OUTPATIENT)
Dept: PHARMACY | Facility: HOSPITAL | Age: 34
End: 2024-11-14
Payer: MEDICARE

## 2024-11-14 DIAGNOSIS — E10.9 TYPE 1 DIABETES MELLITUS WITHOUT COMPLICATION: ICD-10-CM

## 2024-11-14 RX ORDER — METFORMIN HYDROCHLORIDE 500 MG/1
500 TABLET, EXTENDED RELEASE ORAL
Qty: 100 TABLET | Refills: 3 | Status: SHIPPED | OUTPATIENT
Start: 2024-11-14 | End: 2025-12-19

## 2024-11-25 ENCOUNTER — PATIENT MESSAGE (OUTPATIENT)
Dept: ENDOCRINOLOGY | Facility: CLINIC | Age: 34
End: 2024-11-25
Payer: MEDICARE

## 2024-11-25 DIAGNOSIS — E10.65 TYPE 1 DIABETES MELLITUS WITH HYPERGLYCEMIA, WITH LONG-TERM CURRENT USE OF INSULIN: ICD-10-CM

## 2024-11-26 RX ORDER — INSULIN LISPRO 200 [IU]/ML
INJECTION, SOLUTION SUBCUTANEOUS
Qty: 126 ML | Refills: 0 | Status: SHIPPED | OUTPATIENT
Start: 2024-11-26

## 2024-12-16 DIAGNOSIS — E10.65 TYPE 1 DIABETES MELLITUS WITH HYPERGLYCEMIA, WITH LONG-TERM CURRENT USE OF INSULIN: ICD-10-CM

## 2024-12-16 RX ORDER — INSULIN LISPRO 200 [IU]/ML
INJECTION, SOLUTION SUBCUTANEOUS
Qty: 132 ML | Refills: 2 | Status: SHIPPED | OUTPATIENT
Start: 2024-12-16

## 2024-12-24 DIAGNOSIS — E10.65 TYPE 1 DIABETES MELLITUS WITH HYPERGLYCEMIA, WITH LONG-TERM CURRENT USE OF INSULIN: ICD-10-CM

## 2024-12-24 RX ORDER — INSULIN LISPRO 200 [IU]/ML
INJECTION, SOLUTION SUBCUTANEOUS
Qty: 132 ML | Refills: 3 | Status: SHIPPED | OUTPATIENT
Start: 2024-12-24

## 2024-12-26 ENCOUNTER — TELEPHONE (OUTPATIENT)
Dept: ENDOCRINOLOGY | Facility: CLINIC | Age: 34
End: 2024-12-26
Payer: MEDICARE

## 2024-12-31 NOTE — TELEPHONE ENCOUNTER
Received electronic DWO/PO/CMN form from [facility] [facility] via [employer] to complete for pump supplies. Form completed on 12/31 and submitted electronically.

## 2025-01-22 ENCOUNTER — APPOINTMENT (OUTPATIENT)
Dept: ENDOCRINOLOGY | Facility: CLINIC | Age: 35
End: 2025-01-22
Payer: MEDICARE

## 2025-03-19 ENCOUNTER — APPOINTMENT (OUTPATIENT)
Dept: ENDOCRINOLOGY | Facility: CLINIC | Age: 35
End: 2025-03-19
Payer: MEDICARE